# Patient Record
Sex: MALE | URBAN - METROPOLITAN AREA
[De-identification: names, ages, dates, MRNs, and addresses within clinical notes are randomized per-mention and may not be internally consistent; named-entity substitution may affect disease eponyms.]

---

## 2018-01-10 ENCOUNTER — HISTORICAL (OUTPATIENT)
Dept: ADMINISTRATIVE | Facility: HOSPITAL | Age: 48
End: 2018-01-10

## 2019-02-26 ENCOUNTER — HISTORICAL (OUTPATIENT)
Dept: ADMINISTRATIVE | Facility: HOSPITAL | Age: 49
End: 2019-02-26

## 2022-04-10 ENCOUNTER — HISTORICAL (OUTPATIENT)
Dept: ADMINISTRATIVE | Facility: HOSPITAL | Age: 52
End: 2022-04-10

## 2022-04-27 VITALS
SYSTOLIC BLOOD PRESSURE: 131 MMHG | WEIGHT: 186 LBS | HEIGHT: 69 IN | DIASTOLIC BLOOD PRESSURE: 91 MMHG | BODY MASS INDEX: 27.55 KG/M2

## 2022-04-30 NOTE — OP NOTE
Patient:   Micheal Duran             MRN: [id number]            FIN: [id number]               Age:   47 years     Sex:  Male     :  1970   Associated Diagnoses:   None   Author:   Zeb Lal MD      PREOPERATIVE DIAGNOSES:  Pterygium left eye.    POSTOPERATIVE DIAGNOSES:  Pterygium left eye.    PROCEDURE:    1. Pterygium excision with Mitomycin C left.  2. Ocular surface reconstruction using amniotic membrane left eye.    SURGEON:  Zeb Lal MD    ASSISTANT: ANGEL Alvarado    ANESTHESIA:  Topical.    COMPLICATIONS:  None    DESCRIPTION OF PROCEDURE:  After informed consent was obtained, the patient was correctly identified as was the operative eye via time-out in the operating room.  Topical Xylocaine jelly was then applied to the operative eye.  The eye was prepped and draped in a sterile ophthalmic fashion.  A lid speculum was placed in the operative eye and the operating microscope was brought into place.  Topical epinephrine was applied to the eye.  Traction sutures were placed at the limbus at 12 and 6 o'clock using a double armed 7-0 vicryl suture cut in half.  The eye was rotated temporally.  The pterygium was excised using .12 forceps and Claire scissors.  The specimen was then sent to pathology for identification.  The cornea was polished using a shaka sari.  Hemostasis was obtained using wet-field cautery.  The excess Tenons capsule was excised.  A Weck-Chrissy sponge soaked in Mitomycin C .02% was placed at the nasal edge of the excision site for 2 minutes.  After two minutes, the sponge was removed and the eye was thoroughly irrigated with a bottle of BSS.  The amniotic membrane was placed on the patients eye.  The area of the incision was dried with Weck-Chrissy sponges.  The amniotic membrane was then glued onto the area of the excision using Evicel glue.  After 1 minute the membrane was smoothed out using a muscle hook.  The edges of the membrane were checked to see if they  were adherent, which they were.  The membrane was then trimmed using Ignacio scissors.  The lid speculum was removed.  The amniotic membrane was still in good position.  Ointment and antibiotics were applied to the eye.  The eye was patched and shielded and the patient was brought to the PACU in good condition.      Surgery Date 01/10/18 [facility]

## 2022-05-03 NOTE — HISTORICAL [facility] CERNER
This is a historical note converted from [employer]ner. Formatting and pictures may have been removed.  Please reference [employer] for original formatting and attached multimedia. Chief Complaint  PT. REPORTS THAT HE HAS BEEN HAVING RIGHT SHOLDER PAIN FOR ABOUT 1 MONTH. PT. HAS PAIN MOSTLY AT NIGHT. PT. HAD INJ IN THE PAST THAT HELP. ...SB  History of Present Illness  This is a patient that present today with shoulder pain. ?Patient has had this shoulder pain for several months. This shoulder pain is moderate to severe. Patient reports increased pain with overhead movement. Patient reports night pain. Patient reports anterolateral shoulder pain that radiates down the arm. Patient has been treated previously with medication and injection.  Review of Systems  GENERAL: No fevers, chills, unexpected weight loss or gain  MUSCULOSKELETAL: Joint pain, extremity pain  Physical Exam  Vitals & Measurements  HR:?85(Peripheral)? BP:?131/91?  HT:?176?cm? WT:?84.36?kg? BMI:?27.23?  General: Well-developed, well-nourished.  Neuro: Alert and oriented x 3.  Psych: Normal mood and affect.  CV: Palpable radial pulses.  Resp: Smooth and unlabored.  Skin: No evidence of focal lesions or trauma.  Hem/Imm/Lymph: No evidence of lymphangitis or adenopathy.  Gait: No trendelenburg gait.  DTR: 2+, no hypo or hyperreflexia.  Coordination and Balance: No tremors or abnormal station.  Shoulder Exam:  No obvious deformity. No medial or lateral scapula winging. Forward flexion to 140 degrees and abduction to 140 degrees. Positive empty can,? positive Whipple, Martínez, impingement, Positive AC joint tenderness. Negative biceps? groove tenderness, Bro?s, Yergason?s, Speed test. Negative Apprehension and Relocation test. 4/5 strength, normal skin appearance and palpable pulses distally. Sensibility normal.  Assessment/Plan  1.?Right rotator cuff tear?M75.101  I have a strong suspicion that this patient has a right rotator cuff tear. ?For this reason I will  order an MRI.? Place him on ibuprofen 800 mg for now.? May consider injection in the future after the get the MRI.? I will obtain this MRI at [facility].  Ordered:  ibuprofen, 800 mg = 1 tab(s), Oral, TID, PRN PRN as needed for pain, # 90 tab(s), 0 Refill(s), Pharmacy: [id number]  MRI Ext Upper Joint Right W/O Contrast, Routine, 02/26/19 10:46:00 CST, Rotator Cuff Syndrome, None, Stretcher, Patient Has IV?, Rad Type, Order for future visit, Right rotator cuff tear, Schedule this test, Outside Facility, 02/26/19 10:46:00 CST  Office/Outpatient Visit Level 3 New [facility], Right rotator cuff tear, [facility], 02/26/19 10:46:00 CST  ?   Problem List/Past Medical History  Ongoing  Able to lie down  ET - Exercise tolerance  Historical  High cholesterol  Procedure/Surgical History  84205 - LT EXC PTERYGIUM W/ GRFT (Left) (01/10/2018)   Medications  ATORVASTATIN 40 MG TABLET, 40 mg= 1 tab(s), Oral, Daily  ibuprofen 800 mg oral tablet, 800 mg= 1 tab(s), Oral, TID, PRN  [id number] EYE OINTM  OFLOXACIN 0.3% EYE DROPS  PREDNISOLONE AC 1% EYE DROP  VIT D2 1.25 MG (50,000 UNIT), Oral, qWeek  Allergies  No Known Medication Allergies  Social History  Alcohol  Current, Beer, 3-5 times per week, 01/03/2018  Tobacco  Never (less than 100 in lifetime), N/A, 02/26/2019  Never smoker, 01/03/2018  Family History  Family history is negative  Health Maintenance  Health Maintenance  ???Pending?(in the next year)  ??? ??Due?  ??? ? ? ?ADL Screening due??02/26/19??and every 1??year(s)  ??? ? ? ?Alcohol Misuse Screening due??02/26/19??and every 1??year(s)  ??? ? ? ?Aspirin Therapy for CVD Prevention due??02/26/19??and every 1??year(s)  ??? ? ? ?Tetanus Vaccine due??02/26/19??and every 10??year(s)  ???Satisfied?(in the past 1 year)  ??? ??Satisfied?  ??? ? ? ?Blood Pressure Screening on??02/26/19.??Satisfied by Katarina Godinez  ??? ? ? ?Body Mass Index Check on??02/26/19.??Satisfied by Herbert  Katarina ANGELA  ??? ? ? ?Depression Screening on??02/26/19.??Satisfied by Katarina Godinez  ??? ? ? ?Influenza Vaccine on??02/26/19.??Satisfied by Katarina Godinez  ??? ? ? ?Obesity Screening on??02/26/19.??Satisfied by Katarina Godinez  ?  ?  Diagnostic Results  X-rays show no acute osseous pathology.

## 2022-11-15 ENCOUNTER — TELEPHONE (OUTPATIENT)
Dept: GASTROENTEROLOGY | Facility: CLINIC | Age: 52
End: 2022-11-15
Payer: COMMERCIAL

## 2022-11-15 NOTE — TELEPHONE ENCOUNTER
Patient had an episode of vasovagal syncope over the weekend related to severe, acute rectal pain. He also experienced an episode of fecal incontinence while he slept. He was seen by his PCP's nurse practitioner today and they are requesting an appointment ASAP. The last colonoscopy was on 12/8/2021 and revealed grade I internal hemorrhoids. NP is concerned that this may have been the cause of his pain and fecal incontinence.

## 2022-11-16 DIAGNOSIS — K59.4 ANAL SPASM: Primary | ICD-10-CM

## 2024-11-20 ENCOUNTER — TELEPHONE (OUTPATIENT)
Dept: GASTROENTEROLOGY | Facility: CLINIC | Age: 54
End: 2024-11-20

## 2024-11-20 VITALS — BODY MASS INDEX: 28.88 KG/M2 | WEIGHT: 195 LBS | HEIGHT: 69 IN

## 2024-11-20 DIAGNOSIS — Z86.0100 HISTORY OF COLON POLYPS: Primary | ICD-10-CM

## 2024-11-20 RX ORDER — LOSARTAN POTASSIUM 25 MG/1
12.5 TABLET ORAL
COMMUNITY
Start: 2024-10-22

## 2024-11-20 RX ORDER — NAPROXEN SODIUM 220 MG/1
TABLET, FILM COATED ORAL
COMMUNITY

## 2024-11-20 RX ORDER — FENOFIBRATE 160 MG/1
160 TABLET ORAL
COMMUNITY
Start: 2024-09-30

## 2024-11-20 RX ORDER — ROSUVASTATIN CALCIUM 40 MG/1
40 TABLET, COATED ORAL
COMMUNITY
Start: 2024-09-17

## 2024-11-20 RX ORDER — EZETIMIBE 10 MG/1
10 TABLET ORAL
COMMUNITY
Start: 2024-11-07

## 2024-11-20 RX ORDER — METOPROLOL SUCCINATE 25 MG/1
1 CAPSULE, EXTENDED RELEASE ORAL
COMMUNITY
Start: 2024-11-10

## 2024-11-20 NOTE — TELEPHONE ENCOUNTER
----- Message from Zac Waters sent at 11/20/2024 10:42 AM CST -----  Regarding: Schedule Colonoscopy    ----- Message -----  From: Ivelisse Harris  Sent: 11/19/2024   8:27 AM CST  To: Rizwan MOLINA Staff    Patient is calling in regards to will need to scheduled annual colonoscopy please call him back at [phone]

## 2024-11-20 NOTE — TELEPHONE ENCOUNTER
Patient's last colonoscopy was w/  Dr. [facility] on 12/8/2021. Polyp (2 mm) in the cecum. (Polypectomy). Polyp (3 mm) in the descending colon. (Polypectomy). Polyp (3 mm) in the sigmoid colon. (Polypectomy). Grade/Stage I internal hemorrhoids. Per [facility]'s report.     Patient denied having any current problems or issues. Also denied having any hx of seizures, sleep apnea, or kidney disease. Chart was reviewed and updated with patient. Prep instructions were also reviewed and will be mailed to patient's home address.    Colonoscopy scheduled on Wednesday April 30, 2025 with NBP at [facility]. DMP

## 2024-12-16 RX ORDER — SOD SULF/POT CHLORIDE/MAG SULF 1.479 G
TABLET ORAL
Qty: 24 TABLET | Refills: 0 | Status: SHIPPED | OUTPATIENT
Start: 2024-12-16

## 2025-04-16 ENCOUNTER — TELEPHONE (OUTPATIENT)
Dept: GASTROENTEROLOGY | Facility: CLINIC | Age: 55
End: 2025-04-16
Payer: COMMERCIAL

## 2025-04-16 DIAGNOSIS — Z86.0100 HISTORY OF COLON POLYPS: Primary | ICD-10-CM

## 2025-04-16 NOTE — TELEPHONE ENCOUNTER
S/w pt and told him that I was calling as a courtesy regarding up coming Colon with NBP on 4/30/25, Wed and wanted to verify that he has his paper prep instructions and meds. Pt stated he has both.  I also mentioned that [facility] will call the day before (TUES) with the arrival time, GI Lab is located on the third floor, and to pre-register before next Thurs. maryuri.

## 2025-04-16 NOTE — TELEPHONE ENCOUNTER
"  [facility]      Provider: Kendra Astorga MD    Patient Name: Micheal Duran                                                                       (age):1970  54 y.o.           Gender: male   Phone: [phone]     Referring Physician: Jaylyn Funes     Vital Signs:   Height - 5' 9"  Weight - 195 lb    Plan: Colonoscopy @ [facility]     Encounter Diagnosis   Name Primary?    History of colon polyps Yes         History:      Past Medical History:   Diagnosis Date    BMI 28.80 2024    Essential (primary) hypertension     High cholesterol     Hx of grade/stage I internal hemorrhoids     Personal history of colon polyps, unspecified       Past Surgical History:   Procedure Laterality Date    COLONOSCOPY  2021    CORONARY STENT PLACEMENT      blockage.  Dr. Zuñiga    EYE SURGERY Left       Medication List with Changes/Refills   Current Medications    ASPIRIN 81 MG CHEW    Chew 1 tablet every day by oral route.    EZETIMIBE (ZETIA) 10 MG TABLET    Take 10 mg by mouth.    FENOFIBRATE 160 MG TAB    Take 160 mg by mouth.    KAPSPARGO SPRINKLE 25 MG CSPX    Take 1 capsule by mouth.    LOSARTAN (COZAAR) 25 MG TABLET    Take 12.5 mg by mouth.    ROSUVASTATIN (CRESTOR) 40 MG TAB    Take 40 mg by mouth.    SOD SULF-POT CHLORIDE-MAG SULF (SUTAB) 1.479-0.188- 0.225 GRAM TABLET    Take according to package instructions with indicated amount of water. No breakfast day before test. May substitute with Suprep, Clenpiq, Plenvu, Moviprep or GoLytely based on Rx plan and patient preference.      Review of patient's allergies indicates:  No Known Allergies   Family History   Problem Relation Name Age of Onset    Bone cancer Mother      Heart attack Father        Social History[1]          [1]   Social History  Tobacco Use    Smoking status: Some Days     Types: Cigarettes    Smokeless tobacco: Never   Substance Use Topics    Alcohol use: Yes     "

## 2025-04-28 ENCOUNTER — TELEPHONE (OUTPATIENT)
Dept: GASTROENTEROLOGY | Facility: CLINIC | Age: 55
End: 2025-04-28
Payer: COMMERCIAL

## 2025-04-28 NOTE — TELEPHONE ENCOUNTER
----- Message from Ivelisse sent at 4/28/2025 10:19 AM CDT -----  Patient wife is calling to cancel procedure on 4/30/25....

## 2025-04-28 NOTE — TELEPHONE ENCOUNTER
Patient calling saying he needs to reschedule his procedure I left voice mail for patient to call back so I can reschedule his procedure. -AB O

## 2025-04-29 NOTE — TELEPHONE ENCOUNTER
Rec'd message from [facility] that pt has cancelled. Called pt to see if he would like to r/s. Pt stated yes, he is stuck on a rig. I r/s to 7/3/25 - Thurs. I told pt that I would call him the week before as a reminder. Pt has prep meds and instructions. maryuri

## 2025-06-26 ENCOUNTER — TELEPHONE (OUTPATIENT)
Dept: GASTROENTEROLOGY | Facility: CLINIC | Age: 55
End: 2025-06-26
Payer: COMMERCIAL

## 2025-06-26 DIAGNOSIS — Z86.0100 HISTORY OF COLON POLYPS: Primary | ICD-10-CM

## 2025-06-26 NOTE — TELEPHONE ENCOUNTER
"  [facility]      Provider: Kendra Astorga MD    Patient Name: Micheal Duran                                                                       (age):1970  54 y.o.           Gender: male   Phone: [phone]     Referring Physician: Jaylyn Funes     Vital Signs:   Height - 5' 9"  Weight - 195 lb    Plan: Colonoscopy    Encounter Diagnosis   Name Primary?    History of colon polyps Yes         History:      Past Medical History:   Diagnosis Date    BMI 28.80 2024    Essential (primary) hypertension     High cholesterol     Hx of grade/stage I internal hemorrhoids     Personal history of colon polyps, unspecified       Past Surgical History:   Procedure Laterality Date    COLONOSCOPY  2021    CORONARY STENT PLACEMENT      blockage.  Dr. Zuñiga    EYE SURGERY Left       Medication List with Changes/Refills   Current Medications    ASPIRIN 81 MG CHEW    Chew 1 tablet every day by oral route.    EZETIMIBE (ZETIA) 10 MG TABLET    Take 10 mg by mouth.    FENOFIBRATE 160 MG TAB    Take 160 mg by mouth.    KAPSPARGO SPRINKLE 25 MG CSPX    Take 1 capsule by mouth.    LOSARTAN (COZAAR) 25 MG TABLET    Take 12.5 mg by mouth.    ROSUVASTATIN (CRESTOR) 40 MG TAB    Take 40 mg by mouth.    SOD SULF-POT CHLORIDE-MAG SULF (SUTAB) 1.479-0.188- 0.225 GRAM TABLET    Take according to package instructions with indicated amount of water. No breakfast day before test. May substitute with Suprep, Clenpiq, Plenvu, Moviprep or GoLytely based on Rx plan and patient preference.      Review of patient's allergies indicates:  No Known Allergies   Family History   Problem Relation Name Age of Onset    Bone cancer Mother      Heart attack Father        Social History[1]        [1]   Social History  Tobacco Use    Smoking status: Some Days     Types: Cigarettes    Smokeless tobacco: Never   Substance Use Topics    Alcohol use: Yes     "

## 2025-06-26 NOTE — TELEPHONE ENCOUNTER
S/w pt and told him that I was calling as a courtesy regarding up coming Colon with NBP on 7/3/25, Thurs and wanted to verify that he has his paper prep instructions and meds.Pt stated he has both. I also mentioned that [facility] will call the day before (WED) with the arrival time, GI Lab is located on the third floor, and to pre-register before next Wednesday. maryuri

## 2025-07-03 ENCOUNTER — OUTSIDE PLACE OF SERVICE (OUTPATIENT)
Dept: GASTROENTEROLOGY | Facility: CLINIC | Age: 55
End: 2025-07-03

## 2025-07-03 LAB — CRC RECOMMENDATION EXT: NORMAL

## 2025-07-12 ENCOUNTER — RESULTS FOLLOW-UP (OUTPATIENT)
Dept: GASTROENTEROLOGY | Facility: CLINIC | Age: 55
End: 2025-07-12
Payer: COMMERCIAL

## 2025-07-13 NOTE — TELEPHONE ENCOUNTER
2 TA, repeat colonoscopy in 5 years.     Notify patient. Confirm recall tab in appointment desk is up-to-date (update if needed).  NBP

## 2025-07-14 ENCOUNTER — TELEPHONE (OUTPATIENT)
Dept: GASTROENTEROLOGY | Facility: CLINIC | Age: 55
End: 2025-07-14
Payer: COMMERCIAL

## 2025-07-14 NOTE — TELEPHONE ENCOUNTER
Copied from CRM #[id number]. Topic: General Inquiry - Return Call  >> Jul 14, 2025  8:49 AM Mariza wrote:  .Type:  Patient Returning Call    Who Called: lalit  Who Left Message for Patient:pt doesn't know  Does the patient know what this is regarding? Test results for colonoscopy   Would the patient rather a call back or a response via [facility]? Call back  Best Call Back Number:.[phone]   Additional Information:

## 2025-07-15 NOTE — TELEPHONE ENCOUNTER
Called patient went straight to voicemail left message for patient to give office a call back.- abo

## 2025-07-15 NOTE — TELEPHONE ENCOUNTER
Copied from CRM #[id number]. Topic: General Inquiry - Patient Advice  >> Jul 15, 2025  8:23 AM Margie wrote:  Type:  Needs Medical Advice    Who Called: Micheal  Symptoms (please be specific):  missed call  How long has patient had these symptoms:    Pharmacy name and phone #:    Would the patient rather a call back or a response via [facility]? Call back  Best Call Back Number: [phone]  Additional Information:  Patient missed a call. Please call him back. [phone].

## 2025-07-15 NOTE — TELEPHONE ENCOUNTER
Gave patient his colonoscopy results he has 2 polyps benign and will repeat colonoscopy in 5 years, patient understood. -abo

## 2025-08-29 ENCOUNTER — DOCUMENTATION ONLY (OUTPATIENT)
Dept: GASTROENTEROLOGY | Facility: CLINIC | Age: 55
End: 2025-08-29
Payer: COMMERCIAL